# Patient Record
Sex: FEMALE | Race: WHITE | NOT HISPANIC OR LATINO | Employment: OTHER | URBAN - NONMETROPOLITAN AREA
[De-identification: names, ages, dates, MRNs, and addresses within clinical notes are randomized per-mention and may not be internally consistent; named-entity substitution may affect disease eponyms.]

---

## 2018-09-24 ENCOUNTER — OFFICE VISIT CONVERTED (OUTPATIENT)
Dept: FAMILY MEDICINE CLINIC | Age: 38
End: 2018-09-24
Attending: NURSE PRACTITIONER

## 2018-09-26 ENCOUNTER — OFFICE VISIT CONVERTED (OUTPATIENT)
Dept: FAMILY MEDICINE CLINIC | Age: 38
End: 2018-09-26
Attending: NURSE PRACTITIONER

## 2019-07-03 ENCOUNTER — OFFICE VISIT CONVERTED (OUTPATIENT)
Dept: FAMILY MEDICINE CLINIC | Age: 39
End: 2019-07-03
Attending: NURSE PRACTITIONER

## 2019-10-22 ENCOUNTER — OFFICE VISIT CONVERTED (OUTPATIENT)
Dept: FAMILY MEDICINE CLINIC | Age: 39
End: 2019-10-22
Attending: NURSE PRACTITIONER

## 2020-06-30 ENCOUNTER — OFFICE VISIT CONVERTED (OUTPATIENT)
Dept: UROLOGY | Facility: CLINIC | Age: 40
End: 2020-06-30
Attending: UROLOGY

## 2020-07-15 ENCOUNTER — HOSPITAL ENCOUNTER (OUTPATIENT)
Dept: OTHER | Facility: HOSPITAL | Age: 40
Discharge: HOME | End: 2020-07-15
Attending: UROLOGY

## 2020-07-20 ENCOUNTER — TELEPHONE CONVERTED (OUTPATIENT)
Dept: UROLOGY | Facility: CLINIC | Age: 40
End: 2020-07-20
Attending: UROLOGY

## 2020-08-20 ENCOUNTER — HOSPITAL ENCOUNTER (OUTPATIENT)
Dept: OTHER | Facility: HOSPITAL | Age: 40
Discharge: HOME | End: 2020-08-20
Attending: UROLOGY

## 2020-08-24 ENCOUNTER — OFFICE VISIT CONVERTED (OUTPATIENT)
Dept: FAMILY MEDICINE CLINIC | Age: 40
End: 2020-08-24
Attending: NURSE PRACTITIONER

## 2020-08-24 ENCOUNTER — TELEPHONE CONVERTED (OUTPATIENT)
Dept: UROLOGY | Facility: CLINIC | Age: 40
End: 2020-08-24
Attending: UROLOGY

## 2021-05-10 NOTE — H&P
History and Physical      Patient Name: Alise Haas   Patient ID: [id number]   Sex: Female   YOB: 1980        Visit Date: June 30, 2020    Provider: Chino Gonzales MD   Location: [facility]   Location Address: [address] [place] [address]  [id number]   Location Phone: [phone]          Chief Complaint  · pt here for urologic issues       History Of Present Illness  The patient is a 39 year old /White female, who presents on referral from Ladonna Walter MD, for an urological evaluation for right flank pain. The right flank pain began acutely 4 days ago. The pain was 4/10 in severity at the maximum and has a sharp quality. The pain radiates to the groin and has slowly improved, but not resolved. The patient has no additional complaints including hematuria.   The problem is made worse by no known factors. The problem is relieved by no known factors.   Her past medical history is negative for renal stones.   Her family history is negative for renal stones.        Could not tolerate Flomax    Been hurting for about 5 days.    6/27/20 KUB4 mm calculi in lower right pelvis consistent with known ureteral stone.  6/26/2020 CT abdomen/pelvis without [facility] -4 mm minimally obstructing stone on the right.  No other stones      6/20 creatinine 0.9, GFR 67    No urologic family history,   Has never had any urologic surgery.    No cardiopulmonary history.  Patient does not smoke.  Patient does not use blood thinner.         Past Medical History  Kidney stones         Past Surgical History  Thyroid surgery; Tonsillectomy         Medication List  Flomax 0.4 mg oral capsule; Percocet oral         Allergy List  NO KNOWN DRUG ALLERGIES         Family Medical History  Family history of breast cancer         Social History  Tobacco (Never)         Review of Systems  · Constitutional  o Denies  o : chills, fever  · Gastrointestinal  o Denies  o : nausea, vomiting      Vitals  Date Time BP  "Position Site L\R Cuff Size HR RR TEMP (F) WT  HT  BMI kg/m2 BSA m2 O2 Sat        06/30/2020 11:35 AM       17  147lbs 4oz 5'  6\" 23.77 1.76           Physical Examination  · Constitutional  o Appearance  o : Well nourished, well developed patient in no acute distress. Ambulating without difficulty.  · Neck  o Thyroid  o : Normal size without tenderness, nodules or masses  · Respiratory  o Respiratory Effort  o : Breathing is unlabored without accessory muscle use  o Auscultation of Lungs  o : Normal breath sounds  · Gastrointestinal  o Abdominal Examination  o : Scaphoid abdomen which is non-tender to palpation with normal tone and without rigidity or guarding. Normal bowel sounds. No masses present.  o Liver and spleen  o : No hepatomegaly present. Liver is non-tender to palpation and spleen is not palpable.  o Hernias  o : No abdominal wall hernias are present.  · Genitourinary  o Bladder  o : Non-tender to palpation without distension.  o Cervix  o : Healthy appearance, no lesions present, non-tender to palpation, no discharge or bleeding present.  o Uterus  o : Non-tender to palpation without masses. Contour is smooth to palpation with the position in the midline/midplane. Size and shape is normal.  o Adnexa  o : No adnexal tenderness present, no adnexal masses present, ovaries not palpable  · Lymphatic  o Neck  o : No lymphadenopathy present  o Groin  o : No lymphadenopathy present  · Skin and Subcutaneous Tissue  o General Inspection  o : No rashes, lesions or areas of discoloration present. Skin turgor is normal.  o General Palpation  o : No abnormalities, masses or tenderness on palpation.          Results  · In-Office Procedures  o Lab procedure  § Automated Dipstick Urinalysis (Surg [facility]) WITHOUT Micro [facility] (77134)   § Color Ur: Yellow   § Clarity Ur: Clear   § Glucose Ur Ql Strip: Negative   § Bilirub Ur Ql Strip: Negative   § Ketones Ur Ql Strip: Negative   § Sp Gr Ur Qn: 1.020   § Hgb Ur Ql Strip: " Trace-intact   § pH Ur-LsCnc: 8.5   § Prot Ur Ql Strip: Negative   § Urobilinogen Ur Strip-mCnc: 0.2   § Nitrite Ur Ql Strip: Negative   § WBC Est Ur Ql Strip: Trace       Assessment  · Right-sided Ureterolithiasis     592.1    Problems Reconciled  Plan  · Orders  o KUB xray [facility] ([id number]) - 592.1 - 07/14/2020  · Medications  o Medications have been Reconciled  o Transition of Care or Provider Policy  · Instructions  o DISCUSSION:  o The patient has developed a new of stone disease. I have discussed the etiologies of stone disease with emphasis on treatment and management. At this point, the symptoms are minimal and I think we can take a conservative approach. If the symptoms worsen, I have asked the patient to contact my office.  o Electronically Identified Patient Education Materials Provided Electronically       Will try to pass her stone conservatively.  Patient understands if she has fever greater than 100.5, intractable nausea/vomiting or intractable pain should go the emergency room.    Patient cannot tolerate Flomax  continue to strain urine    Patient is having problems she will call in and let us know    Follow-up in 2 weeks with a KUB, this will be done at [facility]    Greater than 20 minutes was used in counseling and coordination of care, with greater than 51% of this in face-to-face counseling             Electronically Signed by: Chino Gonzales MD -Author on June 30, 2020 02:06:40 PM

## 2021-05-13 NOTE — PROGRESS NOTES
Progress Note      Patient Name: Alise Haas   Patient ID: [id number]   Sex: Female   YOB: 1980    Primary Care Provider: Kulwant CHAPPELL    Visit Date: August 24, 2020    Provider: Chino Gonzales MD   Location: [facility]   Location Address: [address] [place] [address]  [id number]   Location Phone: [phone]          Chief Complaint  · urologic issues      History Of Present Illness  TELEHEALTH TELEPHONE VISIT  Alise Haas is a 39 year old /White female who is presenting for evaluation via telehealth telephone visit. Verbal consent obtained before beginning visit.   Provider spent 5 minutes with the patient during the telehealth visit.   The following staff were present during this visit: shameka carrillo   Past Medical History/ Overview of Patient Symptoms     39-year-old female who is being followed for a right 4 mm ureteral stone    Asymptomatic completely.  Doing okay    Ultrasound within normal limits    1st stone    Previous    6/27/20 KUB4 mm calculi in lower right pelvis consistent with known ureteral stone.  6/26/2020 CT abdomen/pelvis without [place] -4 mm minimally obstructing stone on the right.  No other stones    6/20 creatinine 0.9, GFR 67    No urologic family history,   Has never had any urologic surgery.    No cardiopulmonary history.  Patient does not smoke.  Patient does not use blood thinner.         Past Medical History  Kidney stones         Past Surgical History  Thyroid surgery; Tonsillectomy         Allergy List  NO KNOWN DRUG ALLERGIES         Family Medical History  Family history of breast cancer         Social History  Tobacco (Never)         Review of Systems  · Constitutional  o Denies  o : chills, fever  · Gastrointestinal  o Denies  o : nausea, vomiting          Assessment  · Right-sided Ureterolithiasis     592.1    Problems Reconciled  Plan  · Orders  o Physican Telephone evaluation, 5-10 min (41562) - 592.1 -  08/24/2020  · Medications  o Medications have been Reconciled  o Transition of Care or Provider Policy  · Instructions  o Electronically Identified Patient Education Materials Provided Electronically       The patient was counseled on the preventative measures of kidney stones today.  This included increasing fluid intake to make at least 1.5 ml daily, decreasing meat intake, decreasing salt intake and taking in a normal amount of calcium (1000 mg daily).  Information handout given on this today.     We also discussed the DASH diet today for stone prevention and handout was given      Follow-up as needed             Electronically Signed by: Chino Gonzales MD -Author on August 24, 2020 09:39:07 AM

## 2021-05-13 NOTE — PROGRESS NOTES
Progress Note      Patient Name: Alise Haas   Patient ID: [id number]   Sex: Female   YOB: 1980    Primary Care Provider: Kulwant CHAPPELL    Visit Date: July 20, 2020    Provider: Chino Gonzales MD   Location: [facility]   Location Address: [address] [place] [address]  [id number]   Location Phone: [phone]          Chief Complaint  · urologic issues      History Of Present Illness  TELEHEALTH TELEPHONE VISIT  Alise Haas is a 39 year old /White female who is presenting for evaluation via telehealth telephone visit. Verbal consent obtained before beginning visit.   Provider spent 12 minutes with the patient during the telehealth visit.   The following staff were present during this visit: Monik Gomez   Past Medical History/ Overview of Patient Symptoms     39-year-old female who is being followed for a right 4 mm ureteral stone    pt has had 2 weeks of no pain.  Asymptomatic completely.  Doing okay    7/20  KUB  - negative.    Could not tolerate Flomax    Previous    6/27/20 KUB4 mm calculi in lower right pelvis consistent with known ureteral stone.  6/26/2020 CT abdomen/pelvis without Flaget -4 mm minimally obstructing stone on the right.  No other stones      6/20 creatinine 0.9, GFR 67    No urologic family history,   Has never had any urologic surgery.    No cardiopulmonary history.  Patient does not smoke.  Patient does not use blood thinner.         Past Medical History  Kidney stones         Past Surgical History  Thyroid surgery; Tonsillectomy         Medication List  Flomax 0.4 mg oral capsule; Percocet oral         Allergy List  NO KNOWN DRUG ALLERGIES         Family Medical History  Family history of breast cancer         Social History  Tobacco (Never)         Review of Systems  · Constitutional  o Denies  o : chills, fever  · Gastrointestinal  o Denies  o : nausea, vomiting              Assessment  · Right-sided  "Ureterolithiasis     592.1    Problems Reconciled  Plan  · Orders  o Physician Telephone Evaluation, 11-20 minutes ([id number]) - 592.1 - 07/20/2020  o Renal Ultrasound-bilateral ([id number], 96166) - 592.1 - 08/20/2020  · Medications  o Medications have been Reconciled  o Transition of Care or Provider Policy  · Instructions  o Plan Of Care:   o Electronically Identified Patient Education Materials Provided Electronically     100.5, intractable nausea/vomiting or intractable pain she should go the emergency room.    Follow-up in 1 month after renal ultrasound.    \">At this time we will assume patient has passed her stone.    She understands if she has fever > 100.5, intractable nausea/vomiting or intractable pain she should go the emergency room.    Follow-up in 1 month after renal ultrasound.                 Electronically Signed by: Chino Gonzales MD -Author on July 20, 2020 11:16:19 AM  "

## 2021-05-15 VITALS — HEIGHT: 66 IN | BODY MASS INDEX: 23.66 KG/M2 | RESPIRATION RATE: 17 BRPM | WEIGHT: 147.25 LBS

## 2021-05-18 NOTE — PROGRESS NOTES
Alise Haas 1980     Office/Outpatient Visit    Visit Date: Mon, Sep 24, 2018 12:36 pm    Provider: Kulwant Lerma N.P. (Assistant: Betzy Ordaz MA)    Location: [facility]        Electronically signed by Kulwant Lerma N.P. on  2018 01:06:58 PM                             SUBJECTIVE:        CC:     Mr. Haas is a 38 year old White female.  presents today due to back pain, dysuria, took AZO last night         HPI:         Patient to be evaluated for dysuria.  This began yesterday.  Associated symptoms include urinary frequency.  She denies associated fever.  Mr. Haas states that she had multiple prior episodes of similar discomfort, which were diagnosed as simple UTI and vesicoureteral reflux.  Medical history is pertinent for vesicoureteral reflux.  Treatments tried thus far include cranberry juice and Increasing fluid intake.  Treatment effectivenss has been generally ineffective.      ROS:     CONSTITUTIONAL:  Negative for chills, fatigue, fever, and weight change.      CARDIOVASCULAR:  Negative for chest pain, orthopnea, paroxysmal nocturnal dyspnea and pedal edema.      RESPIRATORY:  Negative for dyspnea.      GASTROINTESTINAL:  Negative for abdominal pain, constipation, diarrhea, nausea and vomiting.      GENITOURINARY:  Positive for dysuria.      PSYCHIATRIC:  Negative for anxiety, depression, and sleep disturbances.          PM/FM/:     Last Reviewed on 2018 12:48 PM by Kulwant Lerma    Past Medical History:             PAST MEDICAL HISTORY             GYNECOLOGICAL HISTORY:        Problems with pregnancy have included miscarriage 12 weeks.      Current method of contraception is birth control pills;     No abnormal Paps    Has never had a mammogram.          PAST MEDICAL HISTORY         Positive for    [place] Palsy;         CURRENT MEDICAL PROVIDERS:    Obstetrician/Gynecologist: [facility]    surgeon dr harrell          HEALTH  MAINTENANCE             PAP SMEAR: was last done 2/2016 with normal results         Surgical History:         Tonsillectomy      right thyroidectomy;         Family History:     Father:    Positive for Hypertension and Hyperlipidemia;     Mother:    Positive for Hypertension;     ; Positive for thyroid disorder;         Social History:     Occupation: (Self-Employed). ;     Marital Status:      Children: 2 children         Tobacco/Alcohol/Supplements:     Last Reviewed on 9/24/2018 12:48 PM by Kulwant Lerma    Tobacco: She has never smoked.  Non-drinker             Current Problems:     Last Reviewed on 9/24/2018 12:48 PM by Kulwant Lerma    Thyroid disorder, unspecified     Thyroid nodule     Elevated cholesterol     UTI     Hematochezia         Immunizations:     zzFluzone pf-quadrivalent 3 and up 11/17/2015     Fluzone (3 + years dose) 11/12/2007         Allergies:     Last Reviewed on 9/24/2018 12:48 PM by Kulwant Lerma      No Known Drug Allergies.         Current Medications:     Last Reviewed on 9/24/2018 12:48 PM by Kulwant Lerma    Tylenol Extra Strength 500mg Tablet 2 PRN         OBJECTIVE:        Vitals:         Current: 9/24/2018 12:41:35 PM    Ht:  5 ft, 5.25 in;  Wt: 145.6 lbs;  BMI: 24.0    T: 98.1 F (oral);  BP: 114/69 mm Hg (left arm, sitting);  P: 80 bpm (left arm (BP Cuff), sitting);  sCr: 0.75 mg/dL;  GFR: 101.47        Exams:     PHYSICAL EXAM:     GENERAL: vital signs recorded - well developed, well nourished;  no apparent distress;     RESPIRATORY: normal respiratory rate and pattern with no distress; normal breath sounds with no rales, rhonchi, wheezes or rubs;     CARDIOVASCULAR: normal rate; rhythm is regular;  no systolic murmur; no edema;     GASTROINTESTINAL: nontender;     GENITOURINARY: no CVA tenderness;     PSYCHIATRIC:  appropriate affect and demeanor; normal speech pattern; grossly normal memory;         Lab/Test Results:             Glucose,  Urine:  Neg (09/24/2018),     Bilirubin, urine:  Negative (09/24/2018),     Ketones, Urine Strip:  Negative (09/24/2018),     Specific Gravity, urine:  1.020 (09/24/2018),     Blood in Urine:  trace (09/24/2018),     pH, urine:  8.5 (09/24/2018),     Protein Urine QL:  30 mg (09/24/2018),     Urobilinogen, urine:  2.0 E.U./dL (09/24/2018),     Nitrite, Urine:  Positive (09/24/2018),     Leukoctyes, urine:  Trace (09/24/2018),     Appearance:  Clear (09/24/2018),     collection source:  Clean-catch (09/24/2018),     Color:  Orange (09/24/2018),     Performed by::  molina (09/24/2018),             ASSESSMENT:           599.0   N39.0  UTI              DDx:         ORDERS:         Meds Prescribed:       Bactrim DS (Trimethoprim/Sulfamethoxazole ) Tablet Take 1 tablet(s) by mouth q12h for 7 days  #14 (Fourteen) tablet(s) Refills: 0         Lab Orders:       [id number]  Urinalysis, automated, without microscopy  (In-House)         [id number]  [facility] Urine Culture  (Send-Out)                   PLAN:          UTI         FOLLOW-UP: Schedule follow-up appointments on a p.r.n. basis..            Prescriptions:       Bactrim DS (Trimethoprim/Sulfamethoxazole ) Tablet Take 1 tablet(s) by mouth q12h for 7 days  #14 (Fourteen) tablet(s) Refills: 0           Orders:       [id number]  Urinalysis, automated, without microscopy  (In-House)         [id number]  [facility] Urine Culture  (Send-Out)             Patient Education Handouts:       Urinary Tract Infection (UTI)              Patient Recommendations:        For  UTI:     Schedule follow-up appointments as needed.                APPOINTMENT INFORMATION:        Monday Tuesday Wednesday Thursday Friday Saturday Sunday            Time:___________________AM  PM   Date:_____________________             CHARGE CAPTURE:           Primary Diagnosis:     599.0 UTI            N39.0    Urinary tract infection, site not specified              Orders:          [id number]   Office/outpatient visit; established  patient, level 3  (In-House)             [id number]   Urinalysis, automated, without microscopy  (In-House)

## 2021-05-18 NOTE — PROGRESS NOTES
Alise Haas. 1980     Office/Outpatient Visit    Visit Date: Wed, Jul 3, 2019 01:24 pm    Provider: Alexsandra Jaquez N.P. (Assistant: Sahara Hendrickson MA)    Location: [facility]        Electronically signed by Alexsandra Jaquez N.P. on  2019 01:49:44 PM                             SUBJECTIVE:        CC:     Mr. Haas is a 38 year old White female.  dry mouth, sour taste;         HPI: Alise presents with c/o dry mouth and bitter taste in mouth. No upper respiratory symptoms. Otherwise feeling fine. Had thrush with similar symptoms in the past. Symptoms started 2 days ago.     ROS:     CONSTITUTIONAL:  Negative for chills and fever.      EYES:  Negative for eye drainage and eye pain.      E/N/T:  Positive for dry mouth and bitter taste in mouth.   Negative for ear pain or sore throat.      CARDIOVASCULAR:  Negative for chest pain and palpitations.      RESPIRATORY:  Negative for dyspnea and frequent wheezing.      GASTROINTESTINAL:  Negative for abdominal pain and vomiting.      GENITOURINARY:  Negative for dysuria and frequent urination.          [facility]/:     Last Reviewed on 2019 01:37 PM by Alexsandra Jaquez    Past Medical History:             PAST MEDICAL HISTORY             GYNECOLOGICAL HISTORY:        Problems with pregnancy have included miscarriage 12 weeks.      Current method of contraception is birth control pills;     No abnormal Paps    Has never had a mammogram.          PAST MEDICAL HISTORY         Positive for    [place] Palsy;         CURRENT MEDICAL PROVIDERS:    Obstetrician/Gynecologist: [facility]    surgeon dr harrell         PREVENTIVE HEALTH MAINTENANCE             PAP SMEAR: was last done 2019 with normal results         Surgical History:         Tonsillectomy      right thyroidectomy;         Family History:     Father:    Positive for Hypertension and Hyperlipidemia;     Mother:    Positive for Hypertension;     ; Positive for thyroid disorder;          Social History:     Occupation: (Self-Employed). ;     Marital Status:      Children: 2 children         Tobacco/Alcohol/Supplements:     Last Reviewed on 7/03/2019 01:28 PM by Sahara Hendrickson    Tobacco: She has never smoked.  Non-drinker         Substance Abuse History:     Last Reviewed on 9/24/2018 12:48 PM by Kulwant Lerma        Mental Health History:     Last Reviewed on 9/24/2018 12:48 PM by Kulwant Lerma        Communicable Diseases (eg STDs):     Last Reviewed on 9/24/2018 12:48 PM by Kulwant Lerma            Current Problems:     Last Reviewed on 9/24/2018 12:48 PM by Kulwant Lerma    Thyroid disorder, unspecified     Thyroid nodule     Elevated cholesterol     Hematochezia         Immunizations:     zzFluzone pf-quadrivalent 3 and up 11/17/2015     Fluzone (3 + years dose) 11/12/2007         Allergies:     Last Reviewed on 7/03/2019 01:28 PM by Sahara Hendrickson      No Known Drug Allergies.         Current Medications:     Last Reviewed on 7/03/2019 01:28 PM by Sahara Hendrickson    None        OBJECTIVE:        Vitals:         Current: 7/3/2019 1:30:18 PM    Ht:  5 ft, 5.25 in;  Wt: 149.2 lbs;  BMI: 24.6    T: 98.2 F (oral);  BP: 111/66 mm Hg (left arm, sitting);  P: 66 bpm (left arm (BP Cuff), sitting);  sCr: 0.75 mg/dL;  GFR: 102.53        Exams:     PHYSICAL EXAM:     GENERAL: well developed, well nourished;  no apparent distress;     EYES: PERRL, EOMI     E/N/T: EARS: external auditory canal normal;  bilateral TMs are normal;  NOSE: normal turbinates; no sinus tenderness; OROPHARYNX: oral mucosa is normal; posterior pharynx shows no exudate;     RESPIRATORY: normal respiratory rate and pattern with no distress; normal breath sounds with no rales, rhonchi, wheezes or rubs;     CARDIOVASCULAR: normal rate; rhythm is regular;     MUSCULOSKELETAL: normal gait;     NEUROLOGIC: mental status: alert and oriented x 3; GROSSLY INTACT     PSYCHIATRIC: appropriate affect and  demeanor;         ASSESSMENT           112.0   B37.9  Thrush              DDx:     V79.0   Z13.31  Screening for depression              DDx:         ORDERS:         Meds Prescribed:       Nystatin 100,000U/1ml Oral Suspension 1 tsp qid swish and spit x 10 days  #480 (Four [place]ier and [place]y) ml Refills: 0         Other Orders:         Depression screen negative  (In-House)           Negative EtOH screen  (In-House)                   PLAN:          Thrush Will cover for thrush with Nystatin. Pt declines lab work at today's visit. Discussed that recurrent thrush may be underlying symptoms of another etiology. Recommend that she follow up for annual physical and screening labs whether symptoms resolve or not. Verbalized understanding.         FOLLOW-UP: for Annual Checkup           Prescriptions:       Nystatin 100,000U/1ml Oral Suspension 1 tsp qid swish and spit x 10 days  #480 (Four [place]ier [place]y) ml Refills: 0          Screening for depression     MIPS PHQ-9 Depression Screening: Completed form scanned and in chart; Total Score 0; Negative Depression Screen Negative alcohol screen           Orders:         Depression screen negative  (In-House)           Negative EtOH screen  (In-House)               CHARGE CAPTURE           **Please note: ICD descriptions below are intended for billing purposes only and may not represent clinical diagnoses**        Primary Diagnosis:         112.0 Thrush            B37.9    Candidiasis, unspecified              Orders:          [id number]   Office/outpatient visit; established patient, level 3  (In-House)           V79.0 Screening for depression            Z13.31    Encounter for screening for depression              Orders:             Depression screen negative  (In-House)                Negative EtOH screen  (In-House)

## 2021-05-18 NOTE — PROGRESS NOTES
Alise Haas  1980     Office/Outpatient Visit    Visit Date: Mon, Aug 24, 2020 01:11 pm    Provider: Donna Mares N.P. (Assistant: Betzy Ordaz MA)    Location: [facility]        Electronically signed by Donna Mares N.P. on  2020 02:03:36 PM                             Subjective:        CC: Mr. Haas is a 39 year old White female.  presents today due to ears feel full         HPI:           Patient to be evaluated for acute upper respiratory infection, unspecified.  These have been present for the past several weeks.  The symptoms include ear pain, congestion, stuffiness, and itchy ear / right side.  She has not tried any medications for symptomatic relief.  Pertinent medical history is unremarkable.      ROS:     CONSTITUTIONAL:  Negative for fever.      E/N/T:  Negative for sore throat.      CARDIOVASCULAR:  Negative for chest pain, palpitations, tachycardia, orthopnea, and edema.      RESPIRATORY:  Negative for recent cough and dyspnea.          Past Medical History / Family History / Social History:         Last Reviewed on 2020 01:21 PM by Donna Mares    Past Medical History:             PAST MEDICAL HISTORY             GYNECOLOGICAL HISTORY:        Problems with pregnancy have included miscarriage 12 weeks.      Contraception: partner is S/P vasectomy;    No abnormal Paps    Has never had a mammogram.          PAST MEDICAL HISTORY         Positive for    [place] Palsy;         CURRENT MEDICAL PROVIDERS:    Obstetrician/Gynecologist: [facility]    surgeon dr harrell         PREVENTIVE HEALTH MAINTENANCE             PAP SMEAR: was last done 2019 with normal results         Surgical History:         Tonsillectomy     right thyroidectomy;         Family History:     Father:    Positive for Hypertension and Hyperlipidemia;     Mother:    Positive for Hypertension;     ; Positive for thyroid disorder;         Social History:     Occupation:  (Self-Employed). ;     Marital Status:      Children: 2 children         Tobacco/Alcohol/Supplements:     Last Reviewed on 8/24/2020 01:14 PM by Betzy Ordaz    Tobacco: She has never smoked.  Non-drinker         Immunizations:     zzFluzone pf-quadrivalent 3 and up 11/17/2015    Fluzone (3 + years dose) 11/12/2007    Fluzone Quadrivalent (3+ years) 10/22/2019        Allergies:     Last Reviewed on 8/24/2020 01:14 PM by Betzy Ordaz    No Known Allergies.        Current Medications:     Last Reviewed on 8/24/2020 01:14 PM by Betzy Ordaz    None        Objective:        Vitals:         Current: 8/24/2020 1:15:09 PM    Ht:  5 ft, 5.25 in;  Wt: 150.8 lbs;  BMI: 24.9T: 98 F (oral);  BP: 113/72 mm Hg (left arm, sitting);  P: 74 bpm (left arm (BP Cuff), sitting);  sCr: 0.75 mg/dL;  GFR: 102.02        Exams:     PHYSICAL EXAM:     GENERAL:  well developed and nourished; appropriately groomed; in no apparent distress;     E/N/T: EARS: external auditory canal occluded by cerumen on the right;  the left TM is partially obscured by cerumen, what was visualized of TM normal;  NOSE:  normal nasal mucosa, septum, turbinates, and sinuses; OROPHARYNX:  normal mucosa, dentition, gingiva, and posterior pharynx;     RESPIRATORY: normal respiratory rate and pattern with no distress; normal breath sounds with no rales, rhonchi, wheezes or rubs;     CARDIOVASCULAR: normal rate; rhythm is regular;  no systolic murmur;     LYMPHATIC: no enlargement of cervical or facial nodes;         Procedures:     Impacted cerumen, right ear    Cerumen impaction is noted in the right ear The degree of wax accumulation is minor in the right ear.  With minimal difficulty, using a syringe irrigation, the wax is removed.  Removed from ear was hard balls of wax.  The patient tolerated the procedure well.      There were no complications.  and             Assessment:         H61.21   Impacted cerumen, right ear            ORDERS:         Procedures Ordered:       [id number]  Removal of impacted cerumen right ear (NURSE)  (In-House)                      Plan:         Impacted cerumen, right earTM clear / right ear after irrigation         TESTS/PROCEDURES:  Will proceed with Cerumen Removal--by Nurse: Right ear, to be performed/scheduled now.      FOLLOW-UP:.  :for if any ear symptoms persist or change           Orders:       [id number]  Removal of impacted cerumen right ear (NURSE)  (In-House)                  Patient Recommendations:        For  Impacted cerumen, right ear:                    APPOINTMENT INFORMATION:        Monday Tuesday Wednesday Thursday Friday Saturday Sunday            Time:___________________AM  PM   Date:_____________________             Charge Capture:         Primary Diagnosis:     H61.21  Impacted cerumen, right ear           Orders:      [id number]  Office/outpatient visit; established patient, level 3  (In-House)            [id number]  Removal of impacted cerumen right ear (NURSE)  (In-House)

## 2021-05-18 NOTE — PROGRESS NOTES
Alise Haas 1980     Office/Outpatient Visit    Visit Date: Wed, Sep 26, 2018 02:00 pm    Provider: Radha Kendrick N.P. (Assistant: Sarah Spurling, MA)    Location: [facility]        Electronically signed by Radha Kendrick N.P. on  2018 02:07:39 PM                             SUBJECTIVE:        CC:     Mr. Haas is a 38 year old White female.  presents today due to trouble hearing         HPI:         Patient complains of hearing loss.      Mr. Haas complains of bilateral ear discomfort.  She notes blocked/decreased  hearing of the right ear.  Hearing loss is generally described as asymmetrical.  She denies associated symptoms.  The symptoms began over the past few few weeks weeks ago.  Pertinent history includes ear wax obstruction.      ROS:     CONSTITUTIONAL:  Negative for chills, fatigue and fever.      E/N/T:  Positive for ear pain ( pressure no pain ) and diminished hearing ( right ).      CARDIOVASCULAR:  Negative for chest pain and pedal edema.      RESPIRATORY:  Negative for recent cough.      ALLERGIC/IMMUNOLOGIC:  Positive for seasonal allergies.          PM/FM/:     Last Reviewed on 2018 12:48 PM by Kulwant Lerma    Past Medical History:             PAST MEDICAL HISTORY             GYNECOLOGICAL HISTORY:        Problems with pregnancy have included miscarriage 12 weeks.      Current method of contraception is birth control pills;     No abnormal Paps    Has never had a mammogram.          PAST MEDICAL HISTORY         Positive for    [place] Palsy;         CURRENT MEDICAL PROVIDERS:    Obstetrician/Gynecologist: [facility]    surgeon dr harrell         PREVENTIVE HEALTH MAINTENANCE             PAP SMEAR: was last done 2016 with normal results         Surgical History:         Tonsillectomy      right thyroidectomy;         Family History:     Father:    Positive for Hypertension and Hyperlipidemia;     Mother:    Positive for Hypertension;      ; Positive for thyroid disorder;         Social History:     Occupation: (Self-Employed). ;     Marital Status:      Children: 2 children         Tobacco/Alcohol/Supplements:     Last Reviewed on 9/26/2018 02:02 PM by Spurling, Sarah C    Tobacco: She has never smoked.  Non-drinker         Substance Abuse History:     Last Reviewed on 9/24/2018 12:48 PM by Kulwant Lerma        Mental Health History:     Last Reviewed on 9/24/2018 12:48 PM by Kulwant Lerma        Communicable Diseases (eg STDs):     Last Reviewed on 9/24/2018 12:48 PM by Kulwant Lerma            Current Problems:     Last Reviewed on 9/24/2018 12:48 PM by Kulwant Lerma    Thyroid disorder, unspecified     Thyroid nodule     Elevated cholesterol     Cerumen impaction     UTI     Hematochezia         Immunizations:     zzFluzone pf-quadrivalent 3 and up 11/17/2015     Fluzone (3 + years dose) 11/12/2007         Allergies:     Last Reviewed on 9/26/2018 02:02 PM by Spurling, Sarah C      No Known Drug Allergies.         Current Medications:     Last Reviewed on 9/26/2018 02:03 PM by Spurling, Sarah C    Macrobid 100mg Capsules one BID x 5 days         OBJECTIVE:        Vitals:         Current: 9/26/2018 2:04:37 PM    Ht:  5 ft, 5.25 in;  Wt: 146.2 lbs;  BMI: 24.1    T: 98.6 F (oral);  BP: 107/64 mm Hg (left arm, sitting);  P: 76 bpm (left arm (BP Cuff), sitting);  sCr: 0.75 mg/dL;  GFR: 101.65        Exams:     PHYSICAL EXAM:     GENERAL: vital signs recorded - well developed, well nourished;  no apparent distress;     E/N/T: EARS: external auditory canal normal and occluded by cerumen bilaterally;  bilateral TMs are normal;  OROPHARYNX:  normal mucosa, dentition, gingiva, and posterior pharynx;     NECK: range of motion is normal;     RESPIRATORY: normal appearance and symmetric expansion of chest wall; normal respiratory rate and pattern with no distress; normal breath sounds with no rales, rhonchi, wheezes or  rubs;     CARDIOVASCULAR: normal rate; rhythm is regular;  no edema;     LYMPHATIC: no enlargement of cervical or facial nodes; no supraclavicular nodes;     MUSCULOSKELETAL: normal gait; normal range of motion of all major muscle groups; no limb or joint pain with range of motion;     NEUROLOGIC: mental status: alert and oriented x 3;     PSYCHIATRIC: appropriate affect and demeanor; normal speech pattern; normal thought and perception;         Procedures:     Cerumen impaction         Cerumen/Wax removal: Cerumen impaction is noted in both ears The degree of wax accumulation is minor in the left ear and right ear.  With minimal difficulty, using a syringe irrigation, the wax is removed.  Removed from ear was hard balls of wax.  The patient tolerated the procedure reasonably well.      There were no complications.  Performed by: pr             ASSESSMENT           380.4   H61.23  Cerumen impaction              DDx:         ORDERS:         Procedures Ordered:       [id number]  Removal of impacted cerumen right ear (NURSE)  (In-House)           Other Orders:       [id number]  Removal of impacted cerumen left ear (NURSE)  (In-House)                   PLAN:          Cerumen impaction           Orders:       [id number]  Removal of impacted cerumen left ear (NURSE)  (In-House)         [id number]  Removal of impacted cerumen right ear (NURSE)  (In-House)               CHARGE CAPTURE           **Please note: ICD descriptions below are intended for billing purposes only and may not represent clinical diagnoses**        Primary Diagnosis:         380.4 Cerumen impaction            H61.23    Impacted cerumen, bilateral              Orders:          [id number]   Office/outpatient visit; established patient, level 3  (In-House)             [id number]   Removal of impacted cerumen left ear (NURSE)  (In-House)             [id number]   Removal of impacted cerumen right ear (NURSE)  (In-House)

## 2021-05-18 NOTE — PROGRESS NOTES
Alise Haas 1980     Office/Outpatient Visit    Visit Date: Tue, Oct 22, 2019 10:24 am    Provider: Kulwant Lerma N.P. (Assistant: Eva Lamb MA)    Location: [facility]        Electronically signed by Kulwant Lerma N.P. on  10/22/2019 01:03:51 PM                             SUBJECTIVE:        CC:     Mr. Haas is a 39 year old White female.  This is a follow-up visit.  annual physical. Does not want flu shot today/changed her mind and wants flu shot;         HPI:         Annual exam noted.  Her last physical exam was 1 year ago.  Her last menstrual period was 10/15/19.  Her current method of contraception is a vasectomy in her partner.  She performs breast self-exams occasionally.    Her last Pap smear was in 2019 and was normal.   Preventative Health updated today.  She is current with her Td.  She is not current with influenza immunization.  Mr. Haas denies any history of abnormal Pap smears.  Tobacco: She has never smoked.      ROS:     CONSTITUTIONAL:  Negative for chills, fatigue, fever, and weight change.      EYES:  Negative for blurred vision.      CARDIOVASCULAR:  Negative for chest pain, orthopnea, paroxysmal nocturnal dyspnea and pedal edema.      RESPIRATORY:  Negative for dyspnea.      GASTROINTESTINAL:  Negative for abdominal pain, constipation, diarrhea, nausea and vomiting.      NEUROLOGICAL:  Negative for dizziness, headaches, paresthesias, and weakness.      PSYCHIATRIC:  Negative for anxiety, depression, and sleep disturbances.          PM/FM/SH:     Last Reviewed on 10/22/2019 10:59 AM by Kulwant Lerma    Past Medical History:             PAST MEDICAL HISTORY             GYNECOLOGICAL HISTORY:        Problems with pregnancy have included miscarriage 12 weeks.      Current method of contraception is birth control pills;     No abnormal Paps    Has never had a mammogram.          PAST MEDICAL HISTORY         Positive for    [place] Palsy;          CURRENT MEDICAL PROVIDERS:    Obstetrician/Gynecologist: [facility]    surgeon dr harrell         PREVENTIVE HEALTH MAINTENANCE             PAP SMEAR: was last done 4/2019 with normal results         Surgical History:         Tonsillectomy      right thyroidectomy;         Family History:     Father:    Positive for Hypertension and Hyperlipidemia;     Mother:    Positive for Hypertension;     ; Positive for thyroid disorder;         Social History:     Occupation: (Self-Employed). ;     Marital Status:      Children: 2 children         Tobacco/Alcohol/Supplements:     Last Reviewed on 10/22/2019 10:59 AM by Kulwant Lerma    Tobacco: She has never smoked.  Non-drinker         Mental Health History:     Last Reviewed on 10/22/2019 10:59 AM by Kulwant Lerma            Current Problems:     Last Reviewed on 10/22/2019 10:59 AM by Kulwant Lerma    Elevated cholesterol         Immunizations:     zzFluzone pf-quadrivalent 3 and up 11/17/2015     Fluzone (3 + years dose) 11/12/2007         Allergies:     Last Reviewed on 10/22/2019 10:59 AM by Kulwant Lerma      No Known Drug Allergies.         Current Medications:     Last Reviewed on 10/22/2019 10:59 AM by Kulwant Lerma    None        OBJECTIVE:        Vitals:         Current: 10/22/2019 10:29:57 AM    Ht:  5 ft, 5.25 in;  Wt: 149.8 lbs;  BMI: 24.7    T: 98.8 F (oral);  BP: 106/76 mm Hg (right arm, sitting);  P: 66 bpm (right arm (BP Cuff), sitting);  sCr: 0.75 mg/dL;  GFR: 101.73        Exams:     PHYSICAL EXAM:     GENERAL: vital signs recorded - well developed, well nourished;  no apparent distress;     EYES: extraocular movements intact; conjunctiva and cornea are normal; PERRLA;     E/N/T:  normal EACs, TMs, nasal/oral mucosa, teeth, gingiva, and oropharynx;     NECK: range of motion is normal; thyroid is non-palpable;     RESPIRATORY: normal respiratory rate and pattern with no distress; normal breath sounds  with no rales, rhonchi, wheezes or rubs;     CARDIOVASCULAR: normal rate; rhythm is regular;  no systolic murmur; no edema;     GASTROINTESTINAL: nontender; normal bowel sounds; no organomegaly;     NEUROLOGICAL:  cranial nerves, motor and sensory function, reflexes, gait and coordination are all intact;     PSYCHIATRIC:  appropriate affect and demeanor; normal speech pattern; grossly normal memory;         Lab/Test Results:             Total Cholesterol:  183 (10/22/2019),     HDL:  67 (10/22/2019),     Triglycerides:  76 (10/22/2019),     LDL:  100 (10/22/2019),     non-HDL:  116 (10/22/2019),     LDL/HDL Ratio:  1.5 (10/22/2019),     Glucose capillary:  84 (10/22/2019),     Performed by::  gloria (10/22/2019),             Procedures:     Influenza vaccination     1. Influenza, seasonal PF (children 3 years to adult): 0.5 ml unit dose given IM in the left upper arm; administered by pr 10-22-19;  lot number [id number]; expires 6-30-20 Regarding contraindications to an Influenza vaccine:        No contraindications were noted.              ASSESSMENT:           V70.0   Z00.00  Annual exam              DDx:     V04.81   Z23  Influenza vaccination              DDx:         ORDERS:         Lab Orders:       [id number]  [facility] - H Glucose, serum  (In-House)         85150  [facility] - [facility] Lipid Panel  (In-House)           Procedures Ordered:       [id number]  Collection of capillary blood specimen (eg, finger, heel, ear stick)  (In-House)         32011  Immunization administration; one vaccine  (In-House)           Other Orders:       [id number]  Influenza virus vaccine, quadrivalent, split virus, preservative free 3 years of age & older  (In-House)                   PLAN:          Annual exam     LABORATORY:  Labs ordered to be performed today include glucose [facility] lab and lipid panel.            Orders:       55200  [facility] - [facility] Glucose, serum  (In-House)         94168  [facility] - H Lipid Panel  (In-House)         97747  Collection of capillary  blood specimen (eg, finger, heel, ear stick)  (In-House)            Influenza vaccination           Orders:       74073  Influenza virus vaccine, quadrivalent, split virus, preservative free 3 years of age & older  (In-House)         82992  Immunization administration; one vaccine  (In-House)               CHARGE CAPTURE:           Primary Diagnosis:     V70.0 Annual exam            Z00.00    Encntr for general adult medical exam w/o abnormal findings              Orders:          47201   Preventive medicine, established patient, age 18-39 years  (In-House)             99789   [facility] Glucose, serum  (In-House)             42120   [facility] Lipid Panel  (In-House)             74121   Collection of capillary blood specimen (eg, finger, heel, ear stick)  (In-House)           V04.81 Influenza vaccination            Z23    Encounter for immunization              Orders:          30059   Influenza virus vaccine, quadrivalent, split virus, preservative free 3 years of age & older  (In-House)             47620   Immunization administration; one vaccine  (In-House)

## 2021-07-01 VITALS
SYSTOLIC BLOOD PRESSURE: 114 MMHG | HEIGHT: 65 IN | WEIGHT: 145.6 LBS | BODY MASS INDEX: 24.26 KG/M2 | DIASTOLIC BLOOD PRESSURE: 69 MMHG | HEART RATE: 80 BPM | TEMPERATURE: 98.1 F

## 2021-07-01 VITALS
DIASTOLIC BLOOD PRESSURE: 64 MMHG | TEMPERATURE: 98.6 F | HEIGHT: 65 IN | WEIGHT: 146.2 LBS | HEART RATE: 76 BPM | BODY MASS INDEX: 24.36 KG/M2 | SYSTOLIC BLOOD PRESSURE: 107 MMHG

## 2021-07-01 VITALS
HEIGHT: 65 IN | WEIGHT: 149.8 LBS | DIASTOLIC BLOOD PRESSURE: 76 MMHG | HEART RATE: 66 BPM | TEMPERATURE: 98.8 F | BODY MASS INDEX: 24.96 KG/M2 | SYSTOLIC BLOOD PRESSURE: 106 MMHG

## 2021-07-01 VITALS
TEMPERATURE: 98.2 F | SYSTOLIC BLOOD PRESSURE: 111 MMHG | WEIGHT: 149.2 LBS | DIASTOLIC BLOOD PRESSURE: 66 MMHG | BODY MASS INDEX: 24.86 KG/M2 | HEIGHT: 65 IN | HEART RATE: 66 BPM

## 2021-07-02 VITALS
DIASTOLIC BLOOD PRESSURE: 72 MMHG | HEART RATE: 74 BPM | BODY MASS INDEX: 25.12 KG/M2 | HEIGHT: 65 IN | WEIGHT: 150.8 LBS | TEMPERATURE: 98 F | SYSTOLIC BLOOD PRESSURE: 113 MMHG

## 2021-09-27 ENCOUNTER — APPOINTMENT (OUTPATIENT)
Dept: WOMENS IMAGING | Facility: HOSPITAL | Age: 41
End: 2021-09-27

## 2021-09-27 PROCEDURE — 77063 BREAST TOMOSYNTHESIS BI: CPT | Performed by: RADIOLOGY

## 2021-09-27 PROCEDURE — 77067 SCR MAMMO BI INCL CAD: CPT | Performed by: RADIOLOGY

## 2021-11-16 ENCOUNTER — CLINICAL SUPPORT (OUTPATIENT)
Dept: FAMILY MEDICINE CLINIC | Age: 41
End: 2021-11-16

## 2021-11-16 DIAGNOSIS — Z23 NEED FOR INFLUENZA VACCINATION: Primary | ICD-10-CM

## 2021-11-16 PROCEDURE — 90471 IMMUNIZATION ADMIN: CPT | Performed by: FAMILY MEDICINE

## 2021-11-16 PROCEDURE — 90686 IIV4 VACC NO PRSV 0.5 ML IM: CPT | Performed by: FAMILY MEDICINE

## 2022-09-28 ENCOUNTER — APPOINTMENT (OUTPATIENT)
Dept: WOMENS IMAGING | Facility: HOSPITAL | Age: 42
End: 2022-09-28

## 2022-09-28 PROCEDURE — 77067 SCR MAMMO BI INCL CAD: CPT | Performed by: RADIOLOGY

## 2022-09-28 PROCEDURE — 77063 BREAST TOMOSYNTHESIS BI: CPT | Performed by: RADIOLOGY

## 2023-05-22 ENCOUNTER — OFFICE VISIT (OUTPATIENT)
Dept: FAMILY MEDICINE CLINIC | Age: 43
End: 2023-05-22
Payer: COMMERCIAL

## 2023-05-22 VITALS
HEIGHT: 65 IN | TEMPERATURE: 98 F | SYSTOLIC BLOOD PRESSURE: 114 MMHG | HEART RATE: 79 BPM | BODY MASS INDEX: 27.46 KG/M2 | WEIGHT: 164.8 LBS | DIASTOLIC BLOOD PRESSURE: 73 MMHG | OXYGEN SATURATION: 100 %

## 2023-05-22 DIAGNOSIS — R10.9 RIGHT FLANK PAIN: ICD-10-CM

## 2023-05-22 DIAGNOSIS — N39.0 URINARY TRACT INFECTION WITHOUT HEMATURIA, SITE UNSPECIFIED: Primary | ICD-10-CM

## 2023-05-22 LAB
BACTERIA UR QL AUTO: ABNORMAL /HPF
BILIRUB UR QL STRIP: NEGATIVE
CLARITY UR: ABNORMAL
COLOR UR: YELLOW
GLUCOSE UR STRIP-MCNC: NEGATIVE MG/DL
HGB UR QL STRIP.AUTO: NEGATIVE
KETONES UR QL STRIP: NEGATIVE
LEUKOCYTE ESTERASE UR QL STRIP.AUTO: NEGATIVE
NITRITE UR QL STRIP: NEGATIVE
PH UR STRIP.AUTO: 7 [PH] (ref 5–8)
PROT UR QL STRIP: NEGATIVE
RBC # UR STRIP: ABNORMAL /HPF
REF LAB TEST METHOD: ABNORMAL
SP GR UR STRIP: 1.02 (ref 1–1.03)
SQUAMOUS #/AREA URNS HPF: ABNORMAL /HPF
UROBILINOGEN UR QL STRIP: ABNORMAL
WBC # UR STRIP: ABNORMAL /HPF

## 2023-05-22 PROCEDURE — 99213 OFFICE O/P EST LOW 20 MIN: CPT | Performed by: NURSE PRACTITIONER

## 2023-05-22 PROCEDURE — 81001 URINALYSIS AUTO W/SCOPE: CPT | Performed by: NURSE PRACTITIONER

## 2023-05-22 PROCEDURE — 87086 URINE CULTURE/COLONY COUNT: CPT | Performed by: NURSE PRACTITIONER

## 2023-05-22 RX ORDER — TAMSULOSIN HYDROCHLORIDE 0.4 MG/1
CAPSULE ORAL
COMMUNITY
End: 2023-05-22

## 2023-05-22 RX ORDER — SULFAMETHOXAZOLE AND TRIMETHOPRIM 400; 80 MG/1; MG/1
1 TABLET ORAL 2 TIMES DAILY
Qty: 10 TABLET | Refills: 0 | Status: SHIPPED | OUTPATIENT
Start: 2023-05-22 | End: 2023-05-27

## 2023-05-22 NOTE — PROGRESS NOTES
"Alise Haas presents to [facility] with complaint of  Back Pain (Onset yesterday. (R) Lower back pain. Pt feels like it is in her \"kidneys\" )    SUBJECTIVE  Back Pain  This is a new problem. The current episode started yesterday. The problem occurs constantly. The problem has been gradually worsening since onset. Pain location: right flank  The quality of the pain is described as aching. The pain does not radiate. The pain is moderate. The pain is the same all the time. Pertinent negatives include no abdominal pain, bladder incontinence, bowel incontinence, fever, headaches, tingling or weakness. (Denies hematuria, dysuria, frequency ) Risk factors: hx of pyelo and renal stones           OBJECTIVE  Vital Signs:   /73 (BP Location: Left arm, Patient Position: Sitting, Cuff Size: Small Adult)   Pulse 79   Temp 98 °F (36.7 °C) (Oral)   Ht 165.7 cm (65.24\")   Wt 74.8 kg (164 lb 12.8 oz)   SpO2 100% Comment: room air  BMI 27.23 kg/m²       Physical Exam  Vitals reviewed.   Constitutional:       General: She is not in acute distress.     Appearance: Normal appearance. She is not ill-appearing.   HENT:      Head: Normocephalic and atraumatic.      Nose: Nose normal.      Mouth/Throat:      Mouth: Mucous membranes are moist.      Pharynx: Oropharynx is clear.   Cardiovascular:      Rate and Rhythm: Normal rate and regular rhythm.      Pulses: Normal pulses.      Heart sounds: Normal heart sounds.   Pulmonary:      Effort: Pulmonary effort is normal.      Breath sounds: Normal breath sounds.   Abdominal:      Tenderness: There is right CVA tenderness. There is no left CVA tenderness.   Musculoskeletal:      Cervical back: Neck supple.   Skin:     General: Skin is warm and dry.   Neurological:      General: No focal deficit present.      Mental Status: She is alert and oriented to person, place, and time. Mental status is at baseline.   Psychiatric:         Mood and Affect: " Mood normal.         Behavior: Behavior normal.         Judgment: Judgment normal.          Results Review:  The following data was reviewed by Caryn Meléndez, MISTI [unfilled] 10:16 EDT.    Office Visit on 05/22/2023   Component Date Value Ref Range Status   • Color, UA 05/22/2023 Yellow  Yellow, Straw Final   • Appearance, UA 05/22/2023 Slightly Cloudy (A)  Clear Final   • pH, UA 05/22/2023 7.0  5.0 - 8.0 Final   • Specific Gravity, UA 05/22/2023 1.020  1.005 - 1.030 Final   • Glucose, UA 05/22/2023 Negative  Negative Final   • Ketones, UA 05/22/2023 Negative  Negative Final   • Bilirubin, UA 05/22/2023 Negative  Negative Final   • Blood, UA 05/22/2023 Negative  Negative Final   • Protein, UA 05/22/2023 Negative  Negative Final   • Leuk Esterase, UA 05/22/2023 Negative  Negative Final   • Nitrite, UA 05/22/2023 Negative  Negative Final   • Urobilinogen, UA 05/22/2023 0.2 E.U./dL  0.2 - 1.0 E.U./dL Final   • RBC, UA 05/22/2023 None Seen  None Seen /HPF Final   • WBC, UA 05/22/2023 3-5 (A)  None Seen /HPF Final   • Bacteria, UA 05/22/2023 2+ (A)  None Seen /HPF Final   • Squamous Epithelial Cells, UA 05/22/2023 7-12 (A)  None Seen, 0-2 /HPF Final   • Methodology 05/22/2023 Manual Light Microscopy   Final         ASSESSMENT AND PLAN:  Diagnoses and all orders for this visit:    1. Urinary tract infection without hematuria, site unspecified (Primary)  -     Cancel: Urine Culture - Urine, Urine, Clean Catch; Future  -     sulfamethoxazole-trimethoprim (Bactrim) 400-80 MG tablet; Take 1 tablet by mouth 2 (Two) Times a Day for 5 days.  Dispense: 10 tablet; Refill: 0  -     Urine Culture - Urine, Urine, Clean Catch; Future  -     Urine Culture - Urine, Urine, Clean Catch    2. Right flank pain  -     Cancel: POCT urinalysis dipstick, automated  -     Urinalysis With Microscopic - Urine, Clean Catch; Future  -     Urinalysis With Microscopic - Urine, Clean Catch  -     Urine Culture - Urine, Urine, Clean Catch; Future  -      Urine Culture - Urine, Urine, Clean Catch      Patients UA neg for blood and nitrites. Micro is positive for bacteria and WBCs. Discussed options of watch and wait/see what culture shows vs going ahead and starting abx, since patient does not really having any supporting symptoms to suggest pyelo. Given her CVA tenderness it is reasonable to start abx, pt agrees with plan. If UC neg will stop bactrim.  The patient was encouraged to increase rest and fluids. May take Tylenol for pain or fever. Er precautions discussed. If pain not improving may need imaging.     Follow Up   Return if symptoms worsen or fail to improve. Patient to notify office with any acute concerns or issues.  Patient verbalizes understanding, agrees with plan of care and has no further questions upon discharge.     Patient was given instructions and counseling regarding her condition or for health maintenance advice. Please see specific information pulled into the AVS if appropriate.     Discussed the importance of following up with any needed screening tests/labs/specialist appointments and any requested follow-up recommended by me today. Importance of maintaining follow-up discussed and patient accepts that missed appointments can delay diagnosis and potentially lead to worsening of conditions.    Part of this note may be an electronic transcription/translation of spoken language to printed text using the Dragon Dictation System.

## 2023-05-23 ENCOUNTER — TELEPHONE (OUTPATIENT)
Dept: FAMILY MEDICINE CLINIC | Age: 43
End: 2023-05-23
Payer: COMMERCIAL

## 2023-05-23 LAB — BACTERIA SPEC AEROBE CULT: NO GROWTH

## 2023-05-23 NOTE — TELEPHONE ENCOUNTER
Patient has had 2 treatments of Bactrim. Right side back pain. Kidney stones in the past. Please advise, thank you

## 2023-05-23 NOTE — TELEPHONE ENCOUNTER
Caller: Alise Haas    Relationship: Self    Best call back number: [phone]    What is the best time to reach you: ANYTIME    Who are you requesting to speak with (clinical staff, provider,  specific staff member): NURSE OR MISTI ORTIZ     What was the call regarding: PATIENT IS CALLING TO LET MISTI ORTIZ KNOW THAT HER PAIN HAS INCREASED FROM YESTERDAY AND [facility] IS NO LONGER HELPING.      Do you require a callback: YES

## 2023-05-24 NOTE — TELEPHONE ENCOUNTER
Her preliminary urine culture has not grown any bacteria.  If her pain is severe and she is suspecting a kidney stone she can go to the ER.  Otherwise she may have a follow-up visit scheduled with acute provider tomorrow if she cannot get into PCP per AW

## 2023-08-09 ENCOUNTER — OFFICE VISIT (OUTPATIENT)
Dept: FAMILY MEDICINE CLINIC | Age: 43
End: 2023-08-09
Payer: COMMERCIAL

## 2023-08-09 VITALS
SYSTOLIC BLOOD PRESSURE: 113 MMHG | HEART RATE: 77 BPM | HEIGHT: 65 IN | WEIGHT: 164 LBS | DIASTOLIC BLOOD PRESSURE: 82 MMHG | BODY MASS INDEX: 27.32 KG/M2

## 2023-08-09 DIAGNOSIS — H92.02 OTALGIA, LEFT EAR: Primary | ICD-10-CM

## 2023-08-09 PROCEDURE — 99213 OFFICE O/P EST LOW 20 MIN: CPT | Performed by: NURSE PRACTITIONER

## 2023-08-09 RX ORDER — CEFDINIR 300 MG/1
300 CAPSULE ORAL 2 TIMES DAILY
Qty: 20 CAPSULE | Refills: 0 | Status: SHIPPED | OUTPATIENT
Start: 2023-08-09

## 2023-08-09 NOTE — ASSESSMENT & PLAN NOTE
Recommend she try flonase, may consider Claritin, she finds that too drying, will cover with ATB, consider ENT consult; Rest, increase fluids, follow up if symptoms progress or change

## 2023-08-09 NOTE — PROGRESS NOTES
"Alise Haas presents to [facility].    Chief Complaint:  Earache ((L))         History of Present Illness:  URI  When did symptoms started one month ago   Symptoms: left ear ache, at times feels like fluid is I her ear, no loss of hearing   Treatment tried:medrol dose pack helped some and her ear was irrigated on the right last month     PAST MEDICAL HISTORY changes since             GYNECOLOGICAL HISTORY:        Problems with pregnancy have included miscarriage 12 weeks.      Contraception: partner is S/P vasectomy;    No abnormal Paps    Has never had a mammogram.          PAST MEDICAL HISTORY         Positive for    [place] Palsy;         CURRENT MEDICAL PROVIDERS:    Obstetrician/Gynecologist: [facility]    surgeon dr harrell         PREVENTIVE HEALTH MAINTENANCE             PAP SMEAR: was last done 2019 with normal results         Surgical History:         Tonsillectomy     right thyroidectomy;         Family History:     Father:    Positive for Hypertension and Hyperlipidemia;     Mother:    Positive for Hypertension;     ; Positive for thyroid disorder;         Social History:     Occupation: (Self-Employed). ;     Marital Status:      Children: 2 children         Review of Systems:  Review of Systems   Constitutional:  Negative for fever.   HENT:  Negative for postnasal drip and sore throat.    Respiratory:  Negative for cough and shortness of breath.    Cardiovascular:  Negative for chest pain.        Current Outpatient Medications:     cefdinir (OMNICEF) 300 MG capsule, Take 1 capsule by mouth 2 (Two) Times a Day., Disp: 20 capsule, Rfl: 0    Vital Signs:   Vitals:    23 0911   BP: 113/82   BP Location: Right arm   Patient Position: Sitting   Pulse: 77   Weight: 74.4 kg (164 lb)   Height: 165.7 cm (65.24\")              Physical Exam:  Physical Exam  Constitutional:       General: She is not in acute distress.     Appearance: Normal " appearance.   HENT:      Right Ear: Ear canal and external ear normal. There is impacted cerumen (slight amount of cerumen in distal canal, TM clear other than small scar).      Left Ear: Ear canal and external ear normal. Tympanic membrane is scarred and erythematous (faintly).      Nose: Nose normal.      Mouth/Throat:      Pharynx: Oropharynx is clear. No posterior oropharyngeal erythema.   Cardiovascular:      Rate and Rhythm: Normal rate and regular rhythm.      Heart sounds: No murmur heard.  Pulmonary:      Effort: Pulmonary effort is normal.      Breath sounds: Normal breath sounds.   Lymphadenopathy:      Cervical: No cervical adenopathy.   Neurological:      Mental Status: She is alert.   Psychiatric:         Mood and Affect: Mood normal.         Behavior: Behavior normal.       Result Review      The following data was reviewed by: MISTI Lyman on 08/09/2023:    Results for orders placed or performed in visit on 05/22/23   Urine Culture - Urine, Urine, Clean Catch    Specimen: Urine, Clean Catch   Result Value Ref Range    Urine Culture No growth    Urinalysis without microscopic (no culture) - Urine, Clean Catch    Specimen: Urine, Clean Catch   Result Value Ref Range    Color, UA Yellow Yellow, Straw    Appearance, UA Slightly Cloudy (A) Clear    pH, UA 7.0 5.0 - 8.0    Specific Gravity, UA 1.020 1.005 - 1.030    Glucose, UA Negative Negative    Ketones, UA Negative Negative    Bilirubin, UA Negative Negative    Blood, UA Negative Negative    Protein, UA Negative Negative    Leuk Esterase, UA Negative Negative    Nitrite, UA Negative Negative    Urobilinogen, UA 0.2 E.U./dL 0.2 - 1.0 E.U./dL   Urinalysis, Microscopic Only - Urine, Clean Catch    Specimen: Urine, Clean Catch   Result Value Ref Range    RBC, UA None Seen None Seen /HPF    WBC, UA 3-5 (A) None Seen /HPF    Bacteria, UA 2+ (A) None Seen /HPF    Squamous Epithelial Cells, UA 7-12 (A) None Seen, 0-2 /HPF    Methodology Manual  Light Microscopy                Assessment and Plan:          Diagnoses and all orders for this visit:    1. Otalgia, left ear (Primary)  Assessment & Plan:  Recommend she try flonase, may consider Claritin, she finds that too drying, will cover with ATB, consider ENT consult; Rest, increase fluids, follow up if symptoms progress or change       Orders:  -     cefdinir (OMNICEF) 300 MG capsule; Take 1 capsule by mouth 2 (Two) Times a Day.  Dispense: 20 capsule; Refill: 0                 Follow Up   Return if symptoms worsen or fail to improve.  Patient was given instructions and counseling regarding her condition or for health maintenance advice. Please see specific information pulled into the AVS if appropriate.

## 2023-09-19 ENCOUNTER — APPOINTMENT (OUTPATIENT)
Dept: WOMENS IMAGING | Facility: HOSPITAL | Age: 43
End: 2023-09-19
Payer: COMMERCIAL

## 2023-09-19 PROCEDURE — 76642 ULTRASOUND BREAST LIMITED: CPT | Performed by: RADIOLOGY

## 2023-09-19 PROCEDURE — 77066 DX MAMMO INCL CAD BI: CPT | Performed by: RADIOLOGY

## 2023-09-19 PROCEDURE — 77062 BREAST TOMOSYNTHESIS BI: CPT | Performed by: RADIOLOGY

## 2023-09-19 PROCEDURE — G0279 TOMOSYNTHESIS, MAMMO: HCPCS | Performed by: RADIOLOGY

## 2023-09-29 ENCOUNTER — OFFICE VISIT (OUTPATIENT)
Dept: FAMILY MEDICINE CLINIC | Age: 43
End: 2023-09-29
Payer: COMMERCIAL

## 2023-09-29 VITALS
BODY MASS INDEX: 27.19 KG/M2 | DIASTOLIC BLOOD PRESSURE: 73 MMHG | WEIGHT: 163.2 LBS | HEIGHT: 65 IN | SYSTOLIC BLOOD PRESSURE: 108 MMHG | HEART RATE: 65 BPM | TEMPERATURE: 98.7 F

## 2023-09-29 DIAGNOSIS — R30.0 DYSURIA: Primary | ICD-10-CM

## 2023-09-29 LAB
BACTERIA UR QL AUTO: ABNORMAL /HPF
BILIRUB UR QL STRIP: NEGATIVE
CLARITY UR: ABNORMAL
COLOR UR: YELLOW
GLUCOSE UR STRIP-MCNC: NEGATIVE MG/DL
HGB UR QL STRIP.AUTO: ABNORMAL
KETONES UR QL STRIP: NEGATIVE
LEUKOCYTE ESTERASE UR QL STRIP.AUTO: NEGATIVE
NITRITE UR QL STRIP: NEGATIVE
PH UR STRIP.AUTO: 7.5 [PH] (ref 5–8)
PROT UR QL STRIP: NEGATIVE
RBC # UR STRIP: ABNORMAL /HPF
REF LAB TEST METHOD: ABNORMAL
SP GR UR STRIP: 1.02 (ref 1–1.03)
SQUAMOUS #/AREA URNS HPF: ABNORMAL /HPF
UROBILINOGEN UR QL STRIP: ABNORMAL
WBC # UR STRIP: ABNORMAL /HPF

## 2023-09-29 PROCEDURE — 87086 URINE CULTURE/COLONY COUNT: CPT | Performed by: FAMILY MEDICINE

## 2023-09-29 PROCEDURE — 81001 URINALYSIS AUTO W/SCOPE: CPT | Performed by: FAMILY MEDICINE

## 2023-09-29 PROCEDURE — 99213 OFFICE O/P EST LOW 20 MIN: CPT | Performed by: FAMILY MEDICINE

## 2023-09-29 RX ORDER — SULFAMETHOXAZOLE AND TRIMETHOPRIM 800; 160 MG/1; MG/1
1 TABLET ORAL 2 TIMES DAILY
Qty: 14 TABLET | Refills: 0 | Status: SHIPPED | OUTPATIENT
Start: 2023-09-29

## 2023-09-29 NOTE — PROGRESS NOTES
Alise Haas presents to [facility].    Chief Complaint:  Possible UTI    Subjective   History of Present Illness:  Alise is being seen today for possible UTI.  She has had some difficulty with these off and on.  She noted increasing pressure with urination and lower abdominal discomfort starting about 3 days ago or so.  She denies fever or chills.  She did have some lower back pain the first day, but that does not seem to be a significant issue currently.    Review of Systems:  Review of Systems   Constitutional:  Negative for chills and fever.   Respiratory:  Negative for cough and shortness of breath.    Cardiovascular:  Negative for chest pain and palpitations.   Gastrointestinal:  Negative for abdominal pain, nausea and vomiting.      Objective   Medical History:  Past Medical History:    Kidney stone    Urinary tract infection     Past Surgical History:    ADENOIDECTOMY    TONSILLECTOMY      History reviewed. No pertinent family history.  Social History     Tobacco Use    Smoking status: Never    Smokeless tobacco: Never   Substance Use Topics    Alcohol use: Never       Health Maintenance Due   Topic Date Due    TDAP/TD VACCINES (1 - Tdap) Never done    HEPATITIS C SCREENING  Never done    ANNUAL PHYSICAL  Never done    PAP SMEAR  Never done    COVID-19 Vaccine (4 - 2023-24 season) 09/01/2023        Immunization History   Administered Date(s) Administered    COVID-19 (MODERNA) 1st,2nd,3rd Dose Monovalent 03/25/2021, 04/22/2021    COVID-19 (MODERNA) Monovalent Original Booster 12/10/2021    Fluzone (or Fluarix & Flulaval for VFC) >6mos 11/16/2021    Influenza Injectable Mdck Pf Quad 10/17/2022       No Known Allergies     Medications:  Current Outpatient Medications on File Prior to Visit   Medication Sig    [DISCONTINUED] cefdinir (OMNICEF) 300 MG capsule Take 1 capsule by mouth 2 (Two) Times a Day.     No current facility-administered medications on file prior to visit.  "      Vital Signs:   /73 (BP Location: Right arm, Patient Position: Sitting)   Pulse 65   Temp 98.7 °F (37.1 °C) (Oral)   Ht 165.7 cm (65.24\")   Wt 74 kg (163 lb 3.2 oz)   BMI 26.96 kg/m²       Physical Exam:  Physical Exam  Vitals reviewed.   Constitutional:       General: She is not in acute distress.     Appearance: She is not ill-appearing.   Eyes:      Pupils: Pupils are equal, round, and reactive to light.   Neck:      Comments: No thyromegaly  Cardiovascular:      Rate and Rhythm: Normal rate and regular rhythm.   Pulmonary:      Effort: Pulmonary effort is normal.      Breath sounds: Normal breath sounds.   Abdominal:      General: There is no distension.      Palpations: Abdomen is soft.      Tenderness: There is no abdominal tenderness.   Musculoskeletal:      Cervical back: Neck supple.   Lymphadenopathy:      Cervical: No cervical adenopathy.   Skin:     Findings: No lesion or rash.   Neurological:      Mental Status: She is alert.       Result Review   The following data was reviewed by Harjinder Aden MD on 09/29/2023.  No results found for: WBC, HGB, HCT, MCV, PLT      No results found for: CHOL, CHLPL, TRIG, HDL, LDL, LDLDIRECT  No results found for: TSH  No results found for: HGBA1C         Assessment and Plan:   Today, we have reviewed her care.  Alise's urine is suspicious for another urinary tract infection.  We will cover her for this as noted below and move ahead with urine culture.  We will reach out to her over MyChart when the culture returns.  Hopefully, she will see quick improvement with her symptoms.    Diagnoses and all orders for this visit:    1. Dysuria (Primary)  -     Urinalysis With Microscopic - Urine, Clean Catch  -     Urine Culture - Urine, Urine, Clean Catch  -     sulfamethoxazole-trimethoprim (Bactrim DS) 800-160 MG per tablet; Take 1 tablet by mouth 2 (Two) Times a Day.  Dispense: 14 tablet; Refill: 0    Follow Up  Return if symptoms worsen or fail to " improve.  Patient was given instructions and counseling regarding her condition or for health maintenance advice. Please see specific information pulled into the AVS if appropriate.

## 2023-09-30 LAB — BACTERIA SPEC AEROBE CULT: NORMAL

## 2023-10-16 ENCOUNTER — TELEPHONE (OUTPATIENT)
Dept: FAMILY MEDICINE CLINIC | Age: 43
End: 2023-10-16
Payer: COMMERCIAL

## 2023-10-16 NOTE — TELEPHONE ENCOUNTER
----- Message from Kathy Martinez LPN sent at 10/2/2023 11:23 AM EDT -----  TICKLE to call her in 2 weeks.  Have her urinary symptoms resolved?  If not, I would recommend a repeat urinalysis with micro for follow-up on pyuria.  Thanks.

## 2025-03-05 ENCOUNTER — OFFICE VISIT (OUTPATIENT)
Dept: FAMILY MEDICINE CLINIC | Age: 45
End: 2025-03-05
Payer: COMMERCIAL

## 2025-03-05 VITALS
WEIGHT: 162.4 LBS | HEART RATE: 71 BPM | DIASTOLIC BLOOD PRESSURE: 76 MMHG | OXYGEN SATURATION: 98 % | TEMPERATURE: 97.6 F | HEIGHT: 65 IN | BODY MASS INDEX: 27.06 KG/M2 | SYSTOLIC BLOOD PRESSURE: 113 MMHG

## 2025-03-05 DIAGNOSIS — Z11.59 SCREENING FOR VIRAL DISEASE: ICD-10-CM

## 2025-03-05 DIAGNOSIS — Z00.00 ROUTINE GENERAL MEDICAL EXAMINATION AT A HEALTH CARE FACILITY: Primary | ICD-10-CM

## 2025-03-05 PROBLEM — H92.02 OTALGIA, LEFT EAR: Status: RESOLVED | Noted: 2023-08-09 | Resolved: 2025-03-05

## 2025-03-05 PROCEDURE — 99396 PREV VISIT EST AGE 40-64: CPT | Performed by: NURSE PRACTITIONER

## 2025-03-05 RX ORDER — BUPROPION HYDROCHLORIDE 300 MG/1
TABLET ORAL
COMMUNITY
Start: 2024-12-01

## 2025-03-05 NOTE — PROGRESS NOTES
Chief Complaint  Annual Exam    Subjective          Alise Haas presents to [facility]    History of Present Illness  General Health Questions  Regular exercise as least 3 days a week: started 2 weeks ago   Follows a healthy diet: seeing nutritionist now for 2 weeks following a healthy diet   Wears seat belt always: yes   Drinks Alcohol: rarely   Uses Recreational drugs: none   Uses tobacco products: none   UTD on Tetanus vaccine: over 10 years   Does BSE:occ   Last mammogram: none in epic, but [facility] mamm[facility]    Last colon screen:never   Optometrist: [facility]   Dentist: [facility], Dr Cartwright    PAST MEDICAL HISTORY changes since             GYNECOLOGICAL HISTORY:        Problems with pregnancy have included miscarriage 12 weeks.      Contraception: partner is S/P vasectomy;    No abnormal Paps          PAST MEDICAL HISTORY         Positive for    [place] Palsy;         CURRENT MEDICAL PROVIDERS:    Obstetrician/Gynecologist: [facility]    surgeon dr harrell         PREVENTIVE HEALTH MAINTENANCE                 Surgical History:         Tonsillectomy     right thyroidectomy;         Family History:       Father: Hypertension and Hyperlipidemia; not sure     Mother: Hypertension; skin cancer, thyroid disorder; not sure     Brother: 1, healthy   Daughter's : 2 healthy other than fibromyalgia  MGM:  lung cancer  MGF:  after CVA  PGM:    PGF:           Social History:       Occupation: (Self-Employed). ;     Marital Status:      Children: 2 children                 Past Medical History:   Diagnosis Date    Kidney stone     Urinary tract infection        No Known Allergies     Past Surgical History:   Procedure Laterality Date    ADENOIDECTOMY      TONSILLECTOMY          Social History     Tobacco Use    Smoking status: Never    Smokeless tobacco: Never   Substance Use Topics  "   Alcohol use: Never       History reviewed. No pertinent family history.     Health Maintenance Due   Topic Date Due    TDAP/TD VACCINES (1 - Tdap) Never done    MAMMOGRAM  Never done    HEPATITIS C SCREENING  Never done    PAP SMEAR  Never done    INFLUENZA VACCINE  07/01/2024    BMI FOLLOWUP  07/14/2024    COVID-19 Vaccine (4 - 2024-25 season) 09/01/2024        Current Outpatient Medications on File Prior to Visit   Medication Sig    buPROPion XL (WELLBUTRIN XL) 300 MG 24 hr tablet     [DISCONTINUED] sulfamethoxazole-trimethoprim (Bactrim DS) 800-160 MG per tablet Take 1 tablet by mouth 2 (Two) Times a Day.     No current facility-administered medications on file prior to visit.       Immunization History   Administered Date(s) Administered    COVID-19 (MODERNA) 1st,2nd,3rd Dose Monovalent 03/25/2021, 04/22/2021    COVID-19 (MODERNA) Monovalent Original Booster 12/10/2021    Fluzone (or Fluarix & Flulaval for VFC) >6mos 11/16/2021    Influenza Injectable Mdck Pf Quad 10/17/2022       Review of Systems   Constitutional:  Negative for fatigue and fever.   HENT:  Negative for ear pain and sore throat.    Eyes:  Negative for blurred vision.   Respiratory:  Negative for cough and shortness of breath.    Cardiovascular:  Negative for chest pain, palpitations and leg swelling.   Gastrointestinal:  Negative for abdominal pain, constipation, diarrhea, nausea and vomiting.   Musculoskeletal:  Negative for arthralgias and myalgias.   Skin:  Negative for rash.   Neurological:  Negative for dizziness, weakness and headache.   Psychiatric/Behavioral:  Negative for sleep disturbance and depressed mood.         Objective     Vitals:    03/05/25 1445   BP: 113/76   BP Location: Right arm   Patient Position: Sitting   Cuff Size: Large Adult   Pulse: 71   Temp: 97.6 °F (36.4 °C)   TempSrc: Oral   SpO2: 98%   Weight: 73.7 kg (162 lb 6.4 oz)   Height: 165.7 cm (65.24\")            Physical Exam  Vitals reviewed.   Constitutional:     "   Appearance: Normal appearance. She is well-developed.   HENT:      Head: Normocephalic and atraumatic.      Right Ear: External ear normal.      Left Ear: External ear normal.      Mouth/Throat:      Pharynx: No oropharyngeal exudate.   Eyes:      Conjunctiva/sclera: Conjunctivae normal.      Pupils: Pupils are equal, round, and reactive to light.   Cardiovascular:      Rate and Rhythm: Normal rate and regular rhythm.      Heart sounds: No murmur heard.     No friction rub. No gallop.   Pulmonary:      Effort: Pulmonary effort is normal.      Breath sounds: Normal breath sounds. No wheezing or rhonchi.   Abdominal:      General: Bowel sounds are normal. There is no distension.      Palpations: Abdomen is soft.      Tenderness: There is no abdominal tenderness.      Comments:       Skin:     General: Skin is warm and dry.   Neurological:      Mental Status: She is alert and oriented to person, place, and time.      Cranial Nerves: No cranial nerve deficit.   Psychiatric:         Mood and Affect: Mood and affect normal.         Behavior: Behavior normal.         Thought Content: Thought content normal.         Judgment: Judgment normal.         Result Review :     The following data was reviewed by: MISTI Lyman on 03/05/2025:                       Assessment and Plan      Diagnoses and all orders for this visit:    1. Routine general medical examination at a health care facility (Primary)  Assessment & Plan:  Advise regular exercise, healthy eating, always wear seat belts.  Immunizations discussed, declines any updates today, advised to check with her pharmacy on coverage of Tdap.   To continue yearly optometry and dental exams.    To return fasting for labs.     Orders:  -     Comprehensive Metabolic Panel; Future  -     CBC & Differential; Future  -     TSH; Future  -     Lipid Panel; Future  -     Hepatitis C Antibody; Future    2. Screening for viral disease  Assessment & Plan:  Discussed and will  screen for hep c     Orders:  -     Hepatitis C Antibody; Future        BMI is >= 25 and <30. (Overweight) The following options were offered after discussion;: exercise counseling/recommendations and nutrition counseling/recommendations         Follow Up     Return for fasting for labs.    Patient was given instructions and counseling regarding her condition or for health maintenance advice. Please see specific information pulled into the AVS if appropriate.

## 2025-03-05 NOTE — ASSESSMENT & PLAN NOTE
Advise regular exercise, healthy eating, always wear seat belts.  Immunizations discussed, declines any updates today, advised to check with her pharmacy on coverage of Tdap.   To continue yearly optometry and dental exams.    To return fasting for labs.

## 2025-03-10 ENCOUNTER — LAB (OUTPATIENT)
Dept: LAB | Facility: HOSPITAL | Age: 45
End: 2025-03-10
Payer: COMMERCIAL

## 2025-03-10 DIAGNOSIS — Z00.00 ROUTINE GENERAL MEDICAL EXAMINATION AT A HEALTH CARE FACILITY: ICD-10-CM

## 2025-03-10 DIAGNOSIS — Z11.59 SCREENING FOR VIRAL DISEASE: ICD-10-CM

## 2025-03-10 LAB
ALBUMIN SERPL-MCNC: 4 G/DL (ref 3.5–5.2)
ALBUMIN/GLOB SERPL: 1.5 G/DL
ALP SERPL-CCNC: 65 U/L (ref 39–117)
ALT SERPL W P-5'-P-CCNC: 9 U/L (ref 1–33)
ANION GAP SERPL CALCULATED.3IONS-SCNC: 10 MMOL/L (ref 5–15)
AST SERPL-CCNC: 18 U/L (ref 1–32)
BASOPHILS # BLD AUTO: 0.01 10*3/MM3 (ref 0–0.2)
BASOPHILS NFR BLD AUTO: 0.2 % (ref 0–1.5)
BILIRUB SERPL-MCNC: 0.8 MG/DL (ref 0–1.2)
BUN SERPL-MCNC: 12 MG/DL (ref 6–20)
BUN/CREAT SERPL: 15 (ref 7–25)
CALCIUM SPEC-SCNC: 8.9 MG/DL (ref 8.6–10.5)
CHLORIDE SERPL-SCNC: 104 MMOL/L (ref 98–107)
CHOLEST SERPL-MCNC: 143 MG/DL (ref 0–200)
CO2 SERPL-SCNC: 25 MMOL/L (ref 22–29)
CREAT SERPL-MCNC: 0.8 MG/DL (ref 0.57–1)
DEPRECATED RDW RBC AUTO: 39.5 FL (ref 37–54)
EGFRCR SERPLBLD CKD-EPI 2021: 93.9 ML/MIN/1.73
EOSINOPHIL # BLD AUTO: 0.12 10*3/MM3 (ref 0–0.4)
EOSINOPHIL NFR BLD AUTO: 2.2 % (ref 0.3–6.2)
ERYTHROCYTE [DISTWIDTH] IN BLOOD BY AUTOMATED COUNT: 12.1 % (ref 12.3–15.4)
GLOBULIN UR ELPH-MCNC: 2.7 GM/DL
GLUCOSE SERPL-MCNC: 90 MG/DL (ref 65–99)
HCT VFR BLD AUTO: 38.3 % (ref 34–46.6)
HCV AB SER QL: NORMAL
HDLC SERPL-MCNC: 47 MG/DL (ref 40–60)
HGB BLD-MCNC: 12.7 G/DL (ref 12–15.9)
IMM GRANULOCYTES # BLD AUTO: 0.01 10*3/MM3 (ref 0–0.05)
IMM GRANULOCYTES NFR BLD AUTO: 0.2 % (ref 0–0.5)
LDLC SERPL CALC-MCNC: 86 MG/DL (ref 0–100)
LDLC/HDLC SERPL: 1.85 {RATIO}
LYMPHOCYTES # BLD AUTO: 1.95 10*3/MM3 (ref 0.7–3.1)
LYMPHOCYTES NFR BLD AUTO: 35.6 % (ref 19.6–45.3)
MCH RBC QN AUTO: 29.7 PG (ref 26.6–33)
MCHC RBC AUTO-ENTMCNC: 33.2 G/DL (ref 31.5–35.7)
MCV RBC AUTO: 89.7 FL (ref 79–97)
MONOCYTES # BLD AUTO: 0.45 10*3/MM3 (ref 0.1–0.9)
MONOCYTES NFR BLD AUTO: 8.2 % (ref 5–12)
NEUTROPHILS NFR BLD AUTO: 2.93 10*3/MM3 (ref 1.7–7)
NEUTROPHILS NFR BLD AUTO: 53.6 % (ref 42.7–76)
PLATELET # BLD AUTO: 283 10*3/MM3 (ref 140–450)
PMV BLD AUTO: 10.3 FL (ref 6–12)
POTASSIUM SERPL-SCNC: 4.2 MMOL/L (ref 3.5–5.2)
PROT SERPL-MCNC: 6.7 G/DL (ref 6–8.5)
RBC # BLD AUTO: 4.27 10*6/MM3 (ref 3.77–5.28)
SODIUM SERPL-SCNC: 139 MMOL/L (ref 136–145)
TRIGL SERPL-MCNC: 45 MG/DL (ref 0–150)
TSH SERPL DL<=0.05 MIU/L-ACNC: 0.88 UIU/ML (ref 0.27–4.2)
VLDLC SERPL-MCNC: 10 MG/DL (ref 5–40)
WBC NRBC COR # BLD AUTO: 5.47 10*3/MM3 (ref 3.4–10.8)

## 2025-03-10 PROCEDURE — 86803 HEPATITIS C AB TEST: CPT

## 2025-03-10 PROCEDURE — 80050 GENERAL HEALTH PANEL: CPT

## 2025-03-10 PROCEDURE — 80061 LIPID PANEL: CPT

## 2025-03-10 PROCEDURE — 36415 COLL VENOUS BLD VENIPUNCTURE: CPT

## 2025-08-15 ENCOUNTER — OFFICE VISIT (OUTPATIENT)
Dept: FAMILY MEDICINE CLINIC | Age: 45
End: 2025-08-15
Payer: COMMERCIAL

## 2025-08-15 VITALS
OXYGEN SATURATION: 99 % | WEIGHT: 151 LBS | BODY MASS INDEX: 25.16 KG/M2 | DIASTOLIC BLOOD PRESSURE: 77 MMHG | TEMPERATURE: 98.4 F | SYSTOLIC BLOOD PRESSURE: 113 MMHG | HEART RATE: 67 BPM | HEIGHT: 65 IN

## 2025-08-15 DIAGNOSIS — G51.0 BELL'S PALSY: Primary | ICD-10-CM

## 2025-08-15 PROCEDURE — 99213 OFFICE O/P EST LOW 20 MIN: CPT

## 2025-08-15 RX ORDER — PREDNISONE 20 MG/1
60 TABLET ORAL DAILY
Qty: 21 TABLET | Refills: 0 | Status: SHIPPED | OUTPATIENT
Start: 2025-08-15 | End: 2025-08-22

## 2025-08-25 ENCOUNTER — OFFICE VISIT (OUTPATIENT)
Dept: FAMILY MEDICINE CLINIC | Age: 45
End: 2025-08-25
Payer: COMMERCIAL

## 2025-08-25 VITALS
HEART RATE: 70 BPM | BODY MASS INDEX: 25.12 KG/M2 | DIASTOLIC BLOOD PRESSURE: 72 MMHG | OXYGEN SATURATION: 98 % | WEIGHT: 150.8 LBS | SYSTOLIC BLOOD PRESSURE: 110 MMHG | TEMPERATURE: 97.9 F | HEIGHT: 65 IN

## 2025-08-25 DIAGNOSIS — Z86.69 HISTORY OF BELL'S PALSY: Primary | ICD-10-CM

## 2025-08-25 PROCEDURE — 99212 OFFICE O/P EST SF 10 MIN: CPT | Performed by: NURSE PRACTITIONER
